# Patient Record
Sex: FEMALE | Race: WHITE | NOT HISPANIC OR LATINO | Employment: FULL TIME | URBAN - METROPOLITAN AREA
[De-identification: names, ages, dates, MRNs, and addresses within clinical notes are randomized per-mention and may not be internally consistent; named-entity substitution may affect disease eponyms.]

---

## 2017-05-03 ENCOUNTER — TRANSCRIBE ORDERS (OUTPATIENT)
Dept: ENDOCRINOLOGY | Facility: CLINIC | Age: 37
End: 2017-05-03

## 2017-05-03 DIAGNOSIS — E05.90 HYPERTHYROIDISM: Primary | ICD-10-CM

## 2018-01-04 ENCOUNTER — TELEPHONE (OUTPATIENT)
Dept: INTERNAL MEDICINE | Facility: CLINIC | Age: 38
End: 2018-01-04

## 2021-06-03 ENCOUNTER — OFFICE VISIT (OUTPATIENT)
Dept: OBSTETRICS AND GYNECOLOGY | Facility: CLINIC | Age: 41
End: 2021-06-03

## 2021-06-03 VITALS
WEIGHT: 235 LBS | TEMPERATURE: 97.5 F | SYSTOLIC BLOOD PRESSURE: 148 MMHG | BODY MASS INDEX: 41.64 KG/M2 | DIASTOLIC BLOOD PRESSURE: 88 MMHG | HEIGHT: 63 IN

## 2021-06-03 DIAGNOSIS — Z12.4 SCREENING FOR CERVICAL CANCER: ICD-10-CM

## 2021-06-03 DIAGNOSIS — Z01.419 WOMEN'S ANNUAL ROUTINE GYNECOLOGICAL EXAMINATION: Primary | ICD-10-CM

## 2021-06-03 DIAGNOSIS — N93.9 ABNORMAL UTERINE BLEEDING (AUB): ICD-10-CM

## 2021-06-03 DIAGNOSIS — E66.01 MORBID OBESITY WITH BMI OF 40.0-44.9, ADULT (HCC): ICD-10-CM

## 2021-06-03 PROCEDURE — 99213 OFFICE O/P EST LOW 20 MIN: CPT | Performed by: OBSTETRICS & GYNECOLOGY

## 2021-06-03 PROCEDURE — 99386 PREV VISIT NEW AGE 40-64: CPT | Performed by: OBSTETRICS & GYNECOLOGY

## 2021-06-03 RX ORDER — CHLORTHALIDONE 25 MG/1
TABLET ORAL
COMMUNITY
Start: 2021-04-24

## 2021-06-03 RX ORDER — AZELASTINE HYDROCHLORIDE 137 UG/1
SPRAY, METERED NASAL
COMMUNITY
Start: 2021-05-26

## 2021-06-03 RX ORDER — VENLAFAXINE HYDROCHLORIDE 225 MG/1
225 TABLET, EXTENDED RELEASE ORAL DAILY
COMMUNITY
Start: 2021-04-25

## 2021-06-03 RX ORDER — BUSPIRONE HYDROCHLORIDE 15 MG/1
15 TABLET ORAL DAILY
COMMUNITY
Start: 2021-05-26

## 2021-06-03 RX ORDER — METHOCARBAMOL 750 MG/1
750 TABLET, FILM COATED ORAL 3 TIMES DAILY PRN
COMMUNITY
Start: 2021-04-08

## 2021-06-03 RX ORDER — MONTELUKAST SODIUM 10 MG/1
10 TABLET ORAL DAILY
COMMUNITY
Start: 2021-05-26

## 2021-06-03 RX ORDER — FLUCONAZOLE 150 MG/1
TABLET ORAL
COMMUNITY
Start: 2021-04-08 | End: 2021-06-17

## 2021-06-03 RX ORDER — FLUTICASONE PROPIONATE 50 MCG
2 SPRAY, SUSPENSION (ML) NASAL DAILY
COMMUNITY
Start: 2021-05-26

## 2021-06-03 RX ORDER — RIFAMPIN 300 MG/1
300 CAPSULE ORAL
COMMUNITY

## 2021-06-03 NOTE — PROGRESS NOTES
GYN Annual Exam     CC - Here for annual exam.        FAWAD Davis is a 41 y.o. female, , who presents for annual well woman exam. Patient's last menstrual period was 2021..  Periods are regular every 25-35 days, lasting 7 days. .  Dysmenorrhea:severe, occurring throughout menses.  Patient reports problems with: bleeding with intercourse, dysmenorrhea and abdominal pain. Partner Status: Marital Status: .  New Partners since last visit: yes.  Desires STD Screening: yes.    Patient complained of episodes of abnormal bleeding in May x2.  The first 1 was an mid May and lasted 2 weeks with large clots and flooding.  Bleeding stopped for 1 to 2 weeks and recurred for the week and was not as heavy.  Previously her menses have been monthly.    Additional OB/GYN History   Current contraception: contraceptive methods: Tubal ligation  Desires to: do not start contraception  Last Pap :   Last Completed Pap Smear     This patient has no relevant Health Maintenance data.        History of abnormal Pap smear: no  Family history of uterine, colon, breast, or ovarian cancer: no  Performs monthly Self-Breast Exam: yes  Last mammogram:   Last Completed Mammogram     This patient has no relevant Health Maintenance data.         Exercises Regularly: no  Feelings of Anxiety or Depression: no  Tobacco Usage?: No   OB History        2    Para   2    Term   1       1    AB        Living   2       SAB        TAB        Ectopic        Molar        Multiple        Live Births   2                Health Maintenance   Topic Date Due   • Annual Gynecologic Pelvic and Breast Exam  Never done   • ANNUAL PHYSICAL  Never done   • TDAP/TD VACCINES (1 - Tdap) Never done   • HEPATITIS C SCREENING  Never done   • PAP SMEAR  Never done   • MAMMOGRAM  Never done   • INFLUENZA VACCINE  2021   • COVID-19 Vaccine  Completed   • Pneumococcal Vaccine 0-64  Aged Out       The additional following portions of the  "patient's history were reviewed and updated as appropriate: allergies, current medications, past family history, past medical history, past social history, past surgical history and problem list.    Review of Systems   Constitutional: Negative.    HENT: Negative.    Eyes: Negative.    Respiratory: Negative.    Cardiovascular: Negative.    Gastrointestinal: Negative.    Endocrine: Negative.    Genitourinary: Positive for menstrual problem. Negative for breast discharge, breast lump, breast pain, dyspareunia, dysuria, urgency and vaginal discharge.   Musculoskeletal: Negative.    Skin: Negative.    Allergic/Immunologic: Negative.    Neurological: Negative.    Hematological: Negative.    Psychiatric/Behavioral: Negative.          I have reviewed and agree with the HPI, ROS, and historical information as entered above. Emmanuel Reid MD    Objective   /88   Temp 97.5 °F (36.4 °C)   Ht 160 cm (63\")   Wt 107 kg (235 lb)   LMP 05/27/2021   Breastfeeding No   BMI 41.63 kg/m²     Physical Exam  Vitals and nursing note reviewed. Exam conducted with a chaperone present.   Constitutional:       Appearance: She is well-developed. She is obese.   HENT:      Head: Normocephalic and atraumatic.   Neck:      Thyroid: No thyroid mass or thyromegaly.   Cardiovascular:      Rate and Rhythm: Normal rate and regular rhythm.      Heart sounds: No murmur heard.     Pulmonary:      Effort: Pulmonary effort is normal. No retractions.      Breath sounds: Normal breath sounds. No wheezing, rhonchi or rales.   Chest:      Chest wall: No mass or tenderness.      Breasts:         Right: Normal. No mass, nipple discharge, skin change or tenderness.         Left: Normal. No mass, nipple discharge, skin change or tenderness.   Abdominal:      General: Bowel sounds are normal.      Palpations: Abdomen is soft. Abdomen is not rigid. There is no mass.      Tenderness: There is no abdominal tenderness. There is no guarding.      Hernia: " No hernia is present. There is no hernia in the left inguinal area.   Genitourinary:     Labia:         Right: No rash, tenderness or lesion.         Left: No rash, tenderness or lesion.       Vagina: Normal. No vaginal discharge or lesions.      Cervix: No cervical motion tenderness, discharge, lesion or cervical bleeding.      Uterus: Normal. Not enlarged, not fixed and not tender.       Adnexa:         Right: No mass or tenderness.          Left: No mass or tenderness.        Rectum: No external hemorrhoid.      Comments: Cervix posterior without lesions or discharge.  Small amount of spotting post Pap smear.  Uterus anteverted normal size non tender; no adnexal masses or tenderness.  Musculoskeletal:      Cervical back: Normal range of motion. No muscular tenderness.   Neurological:      Mental Status: She is alert and oriented to person, place, and time.   Psychiatric:         Behavior: Behavior normal.            Assessment and Plan    Problem List Items Addressed This Visit     Morbid obesity with BMI of 40.0-44.9, adult (CMS/[facility])    Overview     BMI 42         Abnormal uterine bleeding (AUB)    Overview     Menses usually monthly; lasting 7 days.   Onset heavy bleeding with large clots x 7 days.          Current Assessment & Plan     Check labs.  Return for u/s.          Relevant Orders    CBC (No Diff)    TSH    Comprehensive Metabolic Panel    Follicle Stimulating Hormone    Antimullerian Hormone (AMH)    Luteinizing Hormone    Hemoglobin A1c    US Non-ob Transvaginal    Screening for cervical cancer    Overview     Pap done 6/3/21:           Other Visit Diagnoses     Women's annual routine gynecological examination    -  Primary    Relevant Orders    Pap IG, Ct-Ng TV Rfx HPV ASCU          1. GYN annual well woman exam.  41 years of age. Pap done.   2. AUB; likely DUB. Labs done and return for u/s.   3. Reviewed monthly self breast exams.  Instructed to call with lumps, pain, or breast discharge.     4. Reviewed BMI and weight loss as preventative health measures.   5. Reccommended Flu Vaccine in Fall of each year.  6. Other: Return for u/s and review of labs; Consider Provera for 14 days to see if AUB resolves.     Emmanuel Reid MD  06/03/2021

## 2021-06-08 DIAGNOSIS — Z01.419 WOMEN'S ANNUAL ROUTINE GYNECOLOGICAL EXAMINATION: ICD-10-CM

## 2021-06-08 LAB
ALBUMIN SERPL-MCNC: 4.5 G/DL (ref 3.8–4.8)
ALBUMIN/GLOB SERPL: 1.8 {RATIO} (ref 1.2–2.2)
ALP SERPL-CCNC: 120 IU/L (ref 48–121)
ALT SERPL-CCNC: 8 IU/L (ref 0–32)
AST SERPL-CCNC: 18 IU/L (ref 0–40)
BILIRUB SERPL-MCNC: <0.2 MG/DL (ref 0–1.2)
BUN SERPL-MCNC: 12 MG/DL (ref 6–24)
BUN/CREAT SERPL: 19 (ref 9–23)
CALCIUM SERPL-MCNC: 8.9 MG/DL (ref 8.7–10.2)
CHLORIDE SERPL-SCNC: 106 MMOL/L (ref 96–106)
CO2 SERPL-SCNC: 24 MMOL/L (ref 20–29)
CREAT SERPL-MCNC: 0.62 MG/DL (ref 0.57–1)
ERYTHROCYTE [DISTWIDTH] IN BLOOD BY AUTOMATED COUNT: 14.1 % (ref 11.7–15.4)
FSH SERPL-ACNC: 5.3 MIU/ML
GLOBULIN SER CALC-MCNC: 2.5 G/DL (ref 1.5–4.5)
GLUCOSE SERPL-MCNC: 71 MG/DL (ref 65–99)
HBA1C MFR BLD: 5.9 % (ref 4.8–5.6)
HCT VFR BLD AUTO: 37.3 % (ref 34–46.6)
HGB BLD-MCNC: 12.3 G/DL (ref 11.1–15.9)
LH SERPL-ACNC: 5.4 MIU/ML
MCH RBC QN AUTO: 27 PG (ref 26.6–33)
MCHC RBC AUTO-ENTMCNC: 33 G/DL (ref 31.5–35.7)
MCV RBC AUTO: 82 FL (ref 79–97)
MIS SERPL-MCNC: 1.32 NG/ML
PLATELET # BLD AUTO: 386 X10E3/UL (ref 150–450)
POTASSIUM SERPL-SCNC: 4.6 MMOL/L (ref 3.5–5.2)
PROT SERPL-MCNC: 7 G/DL (ref 6–8.5)
RBC # BLD AUTO: 4.55 X10E6/UL (ref 3.77–5.28)
SODIUM SERPL-SCNC: 143 MMOL/L (ref 134–144)
TSH SERPL DL<=0.005 MIU/L-ACNC: 1.23 UIU/ML (ref 0.45–4.5)
WBC # BLD AUTO: 9.7 X10E3/UL (ref 3.4–10.8)

## 2021-06-17 ENCOUNTER — OFFICE VISIT (OUTPATIENT)
Dept: OBSTETRICS AND GYNECOLOGY | Facility: CLINIC | Age: 41
End: 2021-06-17

## 2021-06-17 VITALS
SYSTOLIC BLOOD PRESSURE: 146 MMHG | DIASTOLIC BLOOD PRESSURE: 88 MMHG | WEIGHT: 233.4 LBS | HEIGHT: 63 IN | BODY MASS INDEX: 41.36 KG/M2

## 2021-06-17 DIAGNOSIS — Z98.890 HISTORY OF FEMALE STERILIZATION: ICD-10-CM

## 2021-06-17 DIAGNOSIS — Z12.4 SCREENING FOR CERVICAL CANCER: ICD-10-CM

## 2021-06-17 DIAGNOSIS — E66.01 MORBID OBESITY WITH BMI OF 40.0-44.9, ADULT (HCC): Primary | ICD-10-CM

## 2021-06-17 DIAGNOSIS — N93.9 ABNORMAL UTERINE BLEEDING (AUB): ICD-10-CM

## 2021-06-17 PROCEDURE — 99213 OFFICE O/P EST LOW 20 MIN: CPT | Performed by: OBSTETRICS & GYNECOLOGY

## 2021-06-17 RX ORDER — OLOPATADINE HYDROCHLORIDE 1 MG/ML
SOLUTION/ DROPS OPHTHALMIC
COMMUNITY
Start: 2021-06-08

## 2021-06-17 RX ORDER — MEDROXYPROGESTERONE ACETATE 10 MG/1
10 TABLET ORAL DAILY
Qty: 10 TABLET | Refills: 0 | Status: SHIPPED | OUTPATIENT
Start: 2021-06-17 | End: 2021-06-27

## 2021-06-17 NOTE — PROGRESS NOTES
Chief Complaint   Patient presents with   • Follow-up       Subjective   HPI  Genevieve Davis is a 41 y.o. female, , who presents for US due to AUB and review of labs.  LMP 2021.  She states she has experienced this problem for 1 month.  She describes the severity as moderate.  She states that the problem is improving.  The patient reports additional symptoms as none.    Patient states her periods used to be monthly.  Irregular ±1-week.  Over the past 3 months she has noticed her periods were heavier then had the onset of heavy bright red bleeding and clots after intercourse about 1 to 2 weeks ago.  Patient is status post prior tubal ligation 9 years ago.    Had ultrasound performed today which was personally reviewed and showed a normal-appearing uterus with EMC measuring 8 mm.  There is a fluid collection at the top of endometrial cavity measuring 7 mm.    Her last LMP was Patient's last menstrual period was 2021..  Periods are regular every 28-30 days, lasting 7 days.  Dysmenorrhea:severe, occurring first 3 days of flow.  Patient reports problems with: pain with sex.  Partner Status: Marital Status: single.  New Partners since last visit: no.  Desires STD Screening: no.    Additional OB/GYN History   Current contraception: contraceptive methods: Tubal ligation  Desires to: do not start contraception  Last Pap :   Last Completed Pap Smear          PAP SMEAR (Every 3 Years) Next due on 2021  Pap IG, Ct-Ng TV Rfx HPV ASCU              History of abnormal Pap smear: no  Last mammogram: never  Last Completed Mammogram     This patient has no relevant Health Maintenance data.        Tobacco Usage?: No   OB History        2    Para   2    Term   1       1    AB        Living   2       SAB        TAB        Ectopic        Molar        Multiple        Live Births   2                Health Maintenance   Topic Date Due   • Annual Gynecologic Pelvic and Breast Exam  Never done  "  • ANNUAL PHYSICAL  Never done   • TDAP/TD VACCINES (1 - Tdap) Never done   • HEPATITIS C SCREENING  Never done   • MAMMOGRAM  Never done   • INFLUENZA VACCINE  08/01/2021   • PAP SMEAR  06/04/2024   • COVID-19 Vaccine  Completed   • Pneumococcal Vaccine 0-64  Aged Out       The additional following portions of the patient's history were reviewed and updated as appropriate: allergies, current medications, past family history, past medical history, past social history, past surgical history and problem list.    Review of Systems   Constitutional: Negative.    Respiratory: Negative.    Gastrointestinal: Negative for abdominal distention and abdominal pain.   Endocrine: Negative.    Genitourinary: Negative.    Neurological: Negative for headache.   Psychiatric/Behavioral: Negative for dysphoric mood and depressed mood.   All other systems reviewed and are negative.      I have reviewed and agree with the HPI, ROS, and historical information as entered above. Emmanuel Reid MD    Objective   /88 (BP Location: Left arm, Patient Position: Sitting, Cuff Size: Adult)   Ht 160 cm (63\")   Wt 106 kg (233 lb 6.4 oz)   LMP 05/27/2021   Breastfeeding No   BMI 41.34 kg/m²     Physical Exam  Constitutional:       Appearance: She is obese.   HENT:      Head: Normocephalic and atraumatic.   Pulmonary:      Effort: Pulmonary effort is normal.   Abdominal:      General: Abdomen is flat.   Neurological:      Mental Status: She is alert and oriented to person, place, and time.   Psychiatric:         Behavior: Behavior normal.         Assessment/Plan     Assessment     Problem List Items Addressed This Visit     Morbid obesity with BMI of 40.0-44.9, adult (CMS/[facility]) - Primary    Overview     BMI 42         Abnormal uterine bleeding (AUB)    Overview     Menses usually monthly; lasting 7 days.   Onset heavy bleeding with large clots x 7 days.          Relevant Orders    HCG, B-subunit, Quantitative    Estradiol    Insulin, " Random    Luteinizing Hormone    Follicle Stimulating Hormone    Screening for cervical cancer    Overview     Pap done 6/3/21: WAS NEGATIVE. GC / CHLAMYDIA NEGATIVE.          History of female sterilization    Overview     BTL post partum 2012;                1. Abnormal uterine bleeding probable anovulatory bleeding.  Laboratory results were reviewed.  LH and FSH were low normal.  Check estradiol and fasting insulin.  2. Fluid collection in [facility].  Check hCG to rule out pregnancy.  Patient status post tubal ligation.  3. Endometrial thickening.  Rx Provera x10 days starting today.  Patient to expect heavy withdrawal menses.  Repeat ultrasound 1 month.    Plan     Return in about 4 weeks (around 7/15/2021) for Follow-up ultrasound.  1. Discussed option of Mirena IUD to regulate menses and prevent functional bleeding.  2. Call if wishes to order Mirena IUD.      Emmanuel Reid MD  06/17/2021

## 2021-06-22 LAB
HCG INTACT+B SERPL-ACNC: <0.5 MIU/ML
INSULIN SERPL-ACNC: 12 UIU/ML
LH SERPL-ACNC: 1 MIU/ML

## 2021-07-22 ENCOUNTER — LAB (OUTPATIENT)
Dept: OBSTETRICS AND GYNECOLOGY | Facility: CLINIC | Age: 41
End: 2021-07-22

## 2021-07-22 DIAGNOSIS — N93.9 ABNORMAL UTERINE BLEEDING (AUB): Primary | ICD-10-CM

## 2021-07-23 LAB
ESTRADIOL SERPL-MCNC: 52.1 PG/ML
FSH SERPL-ACNC: 6.7 MIU/ML

## 2021-07-29 DIAGNOSIS — E23.0 HYPOTHALAMIC HYPOGONADISM (HCC): Primary | ICD-10-CM

## 2021-08-03 ENCOUNTER — TELEPHONE (OUTPATIENT)
Dept: OBSTETRICS AND GYNECOLOGY | Facility: CLINIC | Age: 41
End: 2021-08-03

## 2021-08-03 NOTE — TELEPHONE ENCOUNTER
Pt called wanting to know lab result. Informed her of her results and the need for a MRI. Pt verbalized understanding.